# Patient Record
Sex: MALE | Race: BLACK OR AFRICAN AMERICAN | URBAN - METROPOLITAN AREA
[De-identification: names, ages, dates, MRNs, and addresses within clinical notes are randomized per-mention and may not be internally consistent; named-entity substitution may affect disease eponyms.]

---

## 2019-10-14 ENCOUNTER — HISTORICAL (OUTPATIENT)
Dept: ADMINISTRATIVE | Facility: HOSPITAL | Age: 64
End: 2019-10-14

## 2019-12-11 ENCOUNTER — HISTORICAL (OUTPATIENT)
Dept: RADIOLOGY | Facility: HOSPITAL | Age: 64
End: 2019-12-11

## 2020-01-07 ENCOUNTER — HISTORICAL (OUTPATIENT)
Dept: SURGERY | Facility: HOSPITAL | Age: 65
End: 2020-01-07

## 2021-04-21 ENCOUNTER — HISTORICAL (OUTPATIENT)
Dept: ADMINISTRATIVE | Facility: HOSPITAL | Age: 66
End: 2021-04-21

## 2021-04-27 LAB
FINAL CULTURE: NORMAL
FINAL CULTURE: NORMAL

## 2021-07-03 ENCOUNTER — HISTORICAL (OUTPATIENT)
Dept: ADMINISTRATIVE | Facility: HOSPITAL | Age: 66
End: 2021-07-03

## 2021-07-05 LAB — FINAL CULTURE: NORMAL

## 2021-07-09 LAB
FINAL CULTURE: NORMAL
FINAL CULTURE: NORMAL

## 2021-10-30 ENCOUNTER — HISTORICAL (OUTPATIENT)
Dept: ADMINISTRATIVE | Facility: HOSPITAL | Age: 66
End: 2021-10-30

## 2021-11-05 LAB
FINAL CULTURE: NORMAL
FINAL CULTURE: NORMAL

## 2021-11-14 ENCOUNTER — HISTORICAL (OUTPATIENT)
Dept: ADMINISTRATIVE | Facility: HOSPITAL | Age: 66
End: 2021-11-14

## 2021-11-21 LAB
FINAL CULTURE: NORMAL
FINAL CULTURE: NORMAL

## 2022-04-09 ENCOUNTER — HISTORICAL (OUTPATIENT)
Dept: ADMINISTRATIVE | Facility: HOSPITAL | Age: 67
End: 2022-04-09

## 2022-04-25 VITALS
SYSTOLIC BLOOD PRESSURE: 161 MMHG | WEIGHT: 200.38 LBS | DIASTOLIC BLOOD PRESSURE: 96 MMHG | BODY MASS INDEX: 29.68 KG/M2 | HEIGHT: 69 IN

## 2022-04-30 NOTE — OP NOTE
Patient:   Pablo Mir             MRN: [id number]            FIN: [id number]               Age:   64 years     Sex:  Male     :  1955   Associated Diagnoses:   None   Author:   Ronda SU MD, Natanael HUGHES      SURGEON: Natanael Bond MD    PREOPERATIVE DIAGNOSIS: Left shoulder AC arthritis, impingement  POSTOPERATIVE DIAGNOSIS: Left shoulder AC arthritis, impingement, biceps tendon tear, humeral head osteochondral defect    PROCEDURE PERFORMED:   1. Left shoulder arthroscopic distal clavicle excision  2. Left shoulder arthroscopic biceps tenotomy  3. Left shoulder arthroscopic humeral head microfracture  4. Left shoulder arthroscopic subacromial decompression    ASSISTANT: STELLA Camp    ANESTHESIA: General plus regional    ESTIMATED BLOOD LOSS: Minimal.    COMPLICATIONS: None.    IMPLANTS:    1.  None    INDICATIONS FOR PROCEDURE: Pablo is a 64y.o. male who has had ongoing left shoulder pain. The patient was seen in the clinic and preoperative imaging has revealed AC joint arthritis and impingement. The patient has failed nonoperative treatment and has elected for operative intervention. Risks and benefits were thoroughly explained and consent was obtained prior to today's procedure.    DESCRIPTION OF PROCEDURE: The patient was seen in the preoperative holding area where the history and physical were reviewed without change. The operative shoulder was marked, consents were reviewed, and any questions were answered for the patient. A regional blockade of the shoulder was performed by the Anesthesia Service. The patient was induced under general anesthesia. Next the patient was placed upright into the beachchair position with care taken to ensure the cervical spine was well positioned and the face, eyes and all bony prominences well protected. Preoperative time-out led by the surgeon was performed verifying the patient, procedure, and preoperative antibiotics. The left upper extremity was then  prepped and draped in the usual sterile fashion and secured to an arm tran.    A standard posterior portal was established with a #11-blade.  The arthroscope was inserted into the glenohumeral joint atraumatically. The joint was insufflated with arthroscopic fluid. We then made a standard anterior portal using an #18-gauge spinal needle for guidance. An arthroscopic probe was inserted through the anterior portal and diagnostic arthroscopy begun.    This revealed a intact biceps tendon with longitudinal tearing. A intact superior labrum. An intact anterior, inferior and posterior labrum. The articular cartilage of the humeral head was intact except for a 1 x 1 cm in width grade 4 change.  I used a microfracture awl in order to stimulate the subchondral bone to promote fibrocartilage. The articular cartilage of the glenoid was intact. The subscapularis tendon was intact. The articular side of the supraspinatus tendon was intact. The articular side of the infraspinatus tendon was intact.    We then used our arthroscopic punch to perform a tenotomy of the biceps tendon. We used arthroscopic shaver to debride any remnant biceps from the superior labrum. The biceps tendon was then allowed to retract to the bicipital groove anteriorly.    We identified the undersurface of the acromion. We used a VAPR to debride the undersurface of the acromion up to the anterior and lateral margins. We identified the CA ligament and reflected it off the acromion. We continued debridement of the bursal tissue.  I was unable to find the AC joint.  I used a motorized bur in order to resect 8 mm off of the distal clavicle.  I was mindful of both the superior and posterior ligaments.    Next, we did our acromioplasty by burring the anterolateral margin of the acromion then working carefully medially. The portals were switched and the acromioplasty was completed through the posterior portal in the cutting block fashion.     Once we completed  this, we took the remaining final arthroscopic pictures. We then removed our instruments, closed the portals with #3-0 monocryl suture. Sterile dressings were applied. The patient was then placed in an abduction pillow and sling, awoken from general anesthetic, and taken to recovery room in a stable condition.
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
